# Patient Record
Sex: FEMALE | Race: WHITE | NOT HISPANIC OR LATINO | RURAL
[De-identification: names, ages, dates, MRNs, and addresses within clinical notes are randomized per-mention and may not be internally consistent; named-entity substitution may affect disease eponyms.]

---

## 2020-08-31 ENCOUNTER — LAB OUTSIDE AN ENCOUNTER (OUTPATIENT)
Dept: RURAL CLINIC 4 | Facility: CLINIC | Age: 55
End: 2020-08-31

## 2020-08-31 ENCOUNTER — OFFICE VISIT (OUTPATIENT)
Dept: RURAL CLINIC 4 | Facility: CLINIC | Age: 55
End: 2020-08-31
Payer: MEDICARE

## 2020-08-31 DIAGNOSIS — K21.9 HIATAL HERNIA WITH GASTROESOPHAGEAL REFLUX: ICD-10-CM

## 2020-08-31 DIAGNOSIS — Z12.11 COLON CANCER SCREENING: ICD-10-CM

## 2020-08-31 PROCEDURE — G8417 CALC BMI ABV UP PARAM F/U: HCPCS | Performed by: INTERNAL MEDICINE

## 2020-08-31 PROCEDURE — 3017F COLORECTAL CA SCREEN DOC REV: CPT | Performed by: INTERNAL MEDICINE

## 2020-08-31 PROCEDURE — 99244 OFF/OP CNSLTJ NEW/EST MOD 40: CPT | Performed by: INTERNAL MEDICINE

## 2020-08-31 PROCEDURE — 99204 OFFICE O/P NEW MOD 45 MIN: CPT | Performed by: INTERNAL MEDICINE

## 2020-08-31 PROCEDURE — G8427 DOCREV CUR MEDS BY ELIG CLIN: HCPCS | Performed by: INTERNAL MEDICINE

## 2020-08-31 PROCEDURE — G9903 PT SCRN TBCO ID AS NON USER: HCPCS | Performed by: INTERNAL MEDICINE

## 2020-08-31 PROCEDURE — 1036F TOBACCO NON-USER: CPT | Performed by: INTERNAL MEDICINE

## 2020-08-31 RX ORDER — POLYETHYLENE GLYCOL 3350, SODIUM SULFATE, SODIUM CHLORIDE, POTASSIUM CHLORIDE, ASCORBIC ACID, SODIUM ASCORBATE 140-9-5.2G
AS DIRECTED KIT ORAL ONCE
Qty: 1 KIT | Refills: 0 | OUTPATIENT
Start: 2020-08-31

## 2020-08-31 NOTE — HPI-TODAY'S VISIT:
AUG 2020 :  Patient was referred by  dr ayala     for further evaluation of    diarrhea and reflux . A note will be faxed over to referring provider with my recommendations.  Patient was referred for diarrhea.  Stool tests did not reveal any ova parasites.  Hemoglobin 11, normal WBC,, normal LFTs.  Giardia, cryptosporidia, C. difficile negative.  Stool culture negative. diarrhea has now resolved.   no prior colonoscopy. chronic gerd symptoms. progressively getting worse. was using dexilant for several yrs. insurance stopped paying. now using protonix and carafate. nut not helping. EGD 1 yr in [place]. was told she had hiatal hernia. was prescribed dexilant BID. which was working well.  No family history of colon cancer / GI malignancies / IBD. No  Dysphagia, Melena, Rectal bleeding, Weight loss, Diarrhea, Constipation, Abdominal pain.

## 2020-09-15 ENCOUNTER — TELEPHONE ENCOUNTER (OUTPATIENT)
Dept: URBAN - METROPOLITAN AREA CLINIC 92 | Facility: CLINIC | Age: 55
End: 2020-09-15

## 2020-09-21 ENCOUNTER — LAB OUTSIDE AN ENCOUNTER (OUTPATIENT)
Dept: URBAN - METROPOLITAN AREA CLINIC 92 | Facility: CLINIC | Age: 55
End: 2020-09-21

## 2020-09-21 ENCOUNTER — OFFICE VISIT (OUTPATIENT)
Dept: RURAL MEDICAL CENTER 2 | Facility: MEDICAL CENTER | Age: 55
End: 2020-09-21
Payer: MEDICARE

## 2020-09-21 ENCOUNTER — TELEPHONE ENCOUNTER (OUTPATIENT)
Dept: URBAN - METROPOLITAN AREA CLINIC 92 | Facility: CLINIC | Age: 55
End: 2020-09-21

## 2020-09-21 DIAGNOSIS — K31.7 BENIGN GASTRIC POLYP: ICD-10-CM

## 2020-09-21 DIAGNOSIS — K22.8 COLUMNAR-LINED ESOPHAGUS: ICD-10-CM

## 2020-09-21 DIAGNOSIS — R19.7 ACUTE DIARRHEA: ICD-10-CM

## 2020-09-21 DIAGNOSIS — K63.89 BACTERIAL OVERGROWTH SYNDROME: ICD-10-CM

## 2020-09-21 PROCEDURE — 45380 COLONOSCOPY AND BIOPSY: CPT | Performed by: INTERNAL MEDICINE

## 2020-09-21 PROCEDURE — 43239 EGD BIOPSY SINGLE/MULTIPLE: CPT | Performed by: INTERNAL MEDICINE

## 2020-09-21 PROCEDURE — G9937 DIG OR SURV COLSCO: HCPCS | Performed by: INTERNAL MEDICINE

## 2020-09-21 RX ORDER — POLYETHYLENE GLYOCOL 3350, SODIUM CHLORIDE, SODIUM BICARBONATE AND POTASSIUM CHLORIDE 420; 11.2; 5.72; 1.48 G/4L; G/4L; G/4L; G/4L
AS DIRECTED POWDER, FOR SOLUTION NASOGASTRIC; ORAL ONCE
Qty: 1 BOTTLE | Refills: 0 | OUTPATIENT
Start: 2020-09-21 | End: 2020-09-22

## 2020-09-21 RX ORDER — POLYETHYLENE GLYCOL 3350, SODIUM SULFATE, SODIUM CHLORIDE, POTASSIUM CHLORIDE, ASCORBIC ACID, SODIUM ASCORBATE 140-9-5.2G
AS DIRECTED KIT ORAL ONCE
Qty: 1 KIT | Refills: 0 | Status: ACTIVE | COMMUNITY
Start: 2020-08-31

## 2020-09-21 RX ORDER — BISACODYL 5 MG
2 TABLETS TABLET, DELAYED RELEASE (ENTERIC COATED) ORAL
Qty: 4 TABLET | Refills: 0 | OUTPATIENT
Start: 2020-09-21 | End: 2020-09-22

## 2020-10-06 ENCOUNTER — OFFICE VISIT (OUTPATIENT)
Dept: RURAL CLINIC 4 | Facility: CLINIC | Age: 55
End: 2020-10-06

## 2021-02-12 ENCOUNTER — OFFICE VISIT (OUTPATIENT)
Dept: RURAL CLINIC 2 | Facility: CLINIC | Age: 56
End: 2021-02-12
Payer: MEDICARE

## 2021-02-12 DIAGNOSIS — R19.4 CHANGE IN BOWEL HABITS: ICD-10-CM

## 2021-02-12 DIAGNOSIS — M06.9 RHEUMATOID ARTHRITIS: ICD-10-CM

## 2021-02-12 DIAGNOSIS — R19.7 DIARRHEA: ICD-10-CM

## 2021-02-12 DIAGNOSIS — E10.9 TYPE 1 DIABETES: ICD-10-CM

## 2021-02-12 DIAGNOSIS — K44.9 HIATAL HERNIA: ICD-10-CM

## 2021-02-12 DIAGNOSIS — K21.9 GERD: ICD-10-CM

## 2021-02-12 PROBLEM — 69896004 RHEUMATOID ARTHRITIS: Status: ACTIVE | Noted: 2021-02-12

## 2021-02-12 PROBLEM — 313435000 TYPE I DIABETES MELLITUS WITHOUT COMPLICATION: Status: ACTIVE | Noted: 2021-02-12

## 2021-02-12 PROCEDURE — G8482 FLU IMMUNIZE ORDER/ADMIN: HCPCS | Performed by: INTERNAL MEDICINE

## 2021-02-12 PROCEDURE — 3017F COLORECTAL CA SCREEN DOC REV: CPT | Performed by: INTERNAL MEDICINE

## 2021-02-12 PROCEDURE — G8427 DOCREV CUR MEDS BY ELIG CLIN: HCPCS | Performed by: INTERNAL MEDICINE

## 2021-02-12 PROCEDURE — G8417 CALC BMI ABV UP PARAM F/U: HCPCS | Performed by: INTERNAL MEDICINE

## 2021-02-12 PROCEDURE — 99214 OFFICE O/P EST MOD 30 MIN: CPT | Performed by: INTERNAL MEDICINE

## 2021-02-12 PROCEDURE — G9903 PT SCRN TBCO ID AS NON USER: HCPCS | Performed by: INTERNAL MEDICINE

## 2021-02-12 RX ORDER — POLYETHYLENE GLYCOL 3350, SODIUM SULFATE, SODIUM CHLORIDE, POTASSIUM CHLORIDE, ASCORBIC ACID, SODIUM ASCORBATE 140-9-5.2G
AS DIRECTED KIT ORAL ONCE
Qty: 1 KIT | Refills: 0 | Status: DISCONTINUED | COMMUNITY
Start: 2020-08-31

## 2021-02-12 RX ORDER — HYDROCHLOROTHIAZIDE 25 MG/1
1 TABLET IN THE MORNING TABLET ORAL ONCE A DAY
Status: ACTIVE | COMMUNITY

## 2021-02-12 RX ORDER — PANTOPRAZOLE SODIUM 20 MG/1
1 TABLET TABLET, DELAYED RELEASE ORAL ONCE A DAY
Status: ACTIVE | COMMUNITY

## 2021-02-12 RX ORDER — TRAMADOL HYDROCHLORIDE 50 MG/1
1 TABLET AS NEEDED TABLET, FILM COATED ORAL ONCE A DAY
Status: DISCONTINUED | COMMUNITY

## 2021-02-12 RX ORDER — POTASSIUM CHLORIDE 10 MEQ/1
1 TABLET WITH FOOD TABLET, FILM COATED, EXTENDED RELEASE ORAL TWICE A DAY
Status: ACTIVE | COMMUNITY

## 2021-02-12 RX ORDER — LORATADINE AND PSEUDOEPHEDRINE SULFATE 5; 120 MG/1; MG/1
1 TABLET AS NEEDED TABLET, EXTENDED RELEASE ORAL
Status: DISCONTINUED | COMMUNITY

## 2021-02-12 RX ORDER — NAPROXEN 500 MG/1
1 TABLET WITH FOOD OR MILK AS NEEDED TABLET ORAL
Status: DISCONTINUED | COMMUNITY

## 2021-02-12 RX ORDER — DEXLANSOPRAZOLE 60 MG/1
1 CAPSULE CAPSULE, DELAYED RELEASE ORAL ONCE A DAY
Qty: 90 | Refills: 3 | OUTPATIENT
Start: 2021-02-12

## 2021-02-12 RX ORDER — INSULIN ASPART 100 [IU]/ML
AS DIRECTED INJECTION, SOLUTION INTRAVENOUS; SUBCUTANEOUS
Status: DISCONTINUED | COMMUNITY

## 2021-02-12 NOTE — HPI-TODAY'S VISIT:
PT comes for evaluation of diarrhea. Saw Dr. Edward about this in 9/2020 but did not like him and wants to change to me. she was having issues with diarrhea. would go several weeks with diarrhea up to 6 times a day - she tells me that she is a type 1 diabetic. she has vasovagal issues. has hiatal hernia and is on PPI. she still has to take mylanta. she was on dexilant for years but insurance would not cover it. - she has not had a ccx.

## 2021-02-15 ENCOUNTER — TELEPHONE ENCOUNTER (OUTPATIENT)
Dept: RURAL CLINIC 2 | Facility: CLINIC | Age: 56
End: 2021-02-15

## 2021-02-15 RX ORDER — DEXLANSOPRAZOLE 60 MG/1
1 CAPSULE CAPSULE, DELAYED RELEASE ORAL ONCE A DAY
Qty: 90 | Refills: 3 | OUTPATIENT
Start: 2021-02-15

## 2021-02-22 ENCOUNTER — TELEPHONE ENCOUNTER (OUTPATIENT)
Dept: URBAN - METROPOLITAN AREA CLINIC 105 | Facility: CLINIC | Age: 56
End: 2021-02-22

## 2021-03-01 LAB
C. DIFFICILE TOXIN A/B, STOOL - QDX: (no result)
GASTROINTESTINAL PATHOGEN: (no result)
PANCREATICELASTASE ELISA, STOOL: (no result)

## 2021-03-04 ENCOUNTER — TELEPHONE ENCOUNTER (OUTPATIENT)
Dept: RURAL CLINIC 4 | Facility: CLINIC | Age: 56
End: 2021-03-04

## 2021-03-16 ENCOUNTER — TELEPHONE ENCOUNTER (OUTPATIENT)
Dept: RURAL CLINIC 2 | Facility: CLINIC | Age: 56
End: 2021-03-16

## 2021-03-16 RX ORDER — PANCRELIPASE LIPASE, PANCRELIPASE PROTEASE, PANCRELIPASE AMYLASE 40000; 126000; 168000 [USP'U]/1; [USP'U]/1; [USP'U]/1
1 CAPSULE CAPSULE, DELAYED RELEASE ORAL
Qty: 270 | Refills: 3 | OUTPATIENT
Start: 2021-03-16 | End: 2022-03-10

## 2021-03-19 ENCOUNTER — TELEPHONE ENCOUNTER (OUTPATIENT)
Dept: URBAN - METROPOLITAN AREA CLINIC 105 | Facility: CLINIC | Age: 56
End: 2021-03-19

## 2021-03-19 RX ORDER — PANCRELIPASE 36000; 180000; 114000 [USP'U]/1; [USP'U]/1; [USP'U]/1
TWO CAPSULES CAPSULE, DELAYED RELEASE PELLETS ORAL
Qty: 540 | Refills: 3 | OUTPATIENT
Start: 2021-03-19 | End: 2021-07-17

## 2021-04-09 ENCOUNTER — OFFICE VISIT (OUTPATIENT)
Dept: RURAL CLINIC 2 | Facility: CLINIC | Age: 56
End: 2021-04-09
Payer: MEDICARE

## 2021-04-09 DIAGNOSIS — E10.9 TYPE 1 DIABETES MELLITUS WITHOUT COMPLICATION: ICD-10-CM

## 2021-04-09 DIAGNOSIS — R94.5 ABNORMAL LFTS: ICD-10-CM

## 2021-04-09 DIAGNOSIS — K86.81 EXOCRINE PANCREATIC INSUFFICIENCY: ICD-10-CM

## 2021-04-09 DIAGNOSIS — K21.9 GASTROESOPHAGEAL REFLUX DISEASE WITHOUT ESOPHAGITIS: ICD-10-CM

## 2021-04-09 DIAGNOSIS — R19.7 DIARRHEA, UNSPECIFIED TYPE: ICD-10-CM

## 2021-04-09 PROCEDURE — 99214 OFFICE O/P EST MOD 30 MIN: CPT | Performed by: INTERNAL MEDICINE

## 2021-04-09 RX ORDER — DEXLANSOPRAZOLE 60 MG/1
1 CAPSULE CAPSULE, DELAYED RELEASE ORAL ONCE A DAY
Qty: 90 | Refills: 3 | Status: DISCONTINUED | COMMUNITY
Start: 2021-02-12

## 2021-04-09 RX ORDER — PANCRELIPASE 36000; 180000; 114000 [USP'U]/1; [USP'U]/1; [USP'U]/1
TWO CAPSULES CAPSULE, DELAYED RELEASE PELLETS ORAL
Qty: 540 | Refills: 3 | Status: ACTIVE | COMMUNITY
Start: 2021-03-19 | End: 2021-07-17

## 2021-04-09 RX ORDER — PANTOPRAZOLE SODIUM 20 MG/1
1 TABLET TABLET, DELAYED RELEASE ORAL ONCE A DAY
Status: ACTIVE | COMMUNITY

## 2021-04-09 RX ORDER — DEXLANSOPRAZOLE 60 MG/1
1 CAPSULE CAPSULE, DELAYED RELEASE ORAL ONCE A DAY
Qty: 90 | Refills: 3 | Status: DISCONTINUED | COMMUNITY
Start: 2021-02-15

## 2021-04-09 RX ORDER — HYDROCHLOROTHIAZIDE 25 MG/1
1 TABLET IN THE MORNING TABLET ORAL ONCE A DAY
Status: ACTIVE | COMMUNITY

## 2021-04-09 RX ORDER — PANCRELIPASE LIPASE, PANCRELIPASE PROTEASE, PANCRELIPASE AMYLASE 40000; 126000; 168000 [USP'U]/1; [USP'U]/1; [USP'U]/1
1 CAPSULE CAPSULE, DELAYED RELEASE ORAL
Qty: 270 | Refills: 3 | Status: DISCONTINUED | COMMUNITY
Start: 2021-03-16 | End: 2022-03-10

## 2021-04-09 RX ORDER — POTASSIUM CHLORIDE 10 MEQ/1
1 TABLET WITH FOOD TABLET, FILM COATED, EXTENDED RELEASE ORAL TWICE A DAY
Status: ACTIVE | COMMUNITY

## 2021-04-09 NOTE — HPI-TODAY'S VISIT:
I saw this patient in February 2021 for diarrhea.  Stool study showed severe exocrine pancreatic insufficiency.  We started Zenpep for that which helped. -   She comes now for evaluation of abnormal liver enzymes.  Laboratory from April 2021 shows an alkaline phosphatase markedly elevated at 559.  AST and ALT were 55 and 62 respectively.  Total bilirubin normal at less than 0.2.  Albumin normal at 3.7.

## 2021-04-16 ENCOUNTER — TELEPHONE ENCOUNTER (OUTPATIENT)
Dept: URBAN - METROPOLITAN AREA CLINIC 92 | Facility: CLINIC | Age: 56
End: 2021-04-16

## 2021-04-29 ENCOUNTER — TELEPHONE ENCOUNTER (OUTPATIENT)
Dept: URBAN - METROPOLITAN AREA CLINIC 105 | Facility: CLINIC | Age: 56
End: 2021-04-29

## 2021-04-29 RX ORDER — PANCRELIPASE 20880; 78300; 78300 [USP'U]/1; [USP'U]/1; [USP'U]/1
THREE CAPSULES TABLET ORAL
Qty: 540 | Refills: 3 | OUTPATIENT
Start: 2021-04-29 | End: 2022-04-24

## 2021-05-10 ENCOUNTER — TELEPHONE ENCOUNTER (OUTPATIENT)
Dept: URBAN - METROPOLITAN AREA CLINIC 92 | Facility: CLINIC | Age: 56
End: 2021-05-10

## 2021-05-10 RX ORDER — PANCRELIPASE LIPASE, PANCRELIPASE PROTEASE, PANCRELIPASE AMYLASE 40000; 126000; 168000 [USP'U]/1; [USP'U]/1; [USP'U]/1
1 CAPSULE CAPSULE, DELAYED RELEASE ORAL
Qty: 270 | Refills: 3 | OUTPATIENT
Start: 2021-03-16 | End: 2022-05-05

## 2021-05-18 ENCOUNTER — TELEPHONE ENCOUNTER (OUTPATIENT)
Dept: URBAN - METROPOLITAN AREA CLINIC 92 | Facility: CLINIC | Age: 56
End: 2021-05-18

## 2021-11-09 ENCOUNTER — WEB ENCOUNTER (OUTPATIENT)
Dept: RURAL CLINIC 2 | Facility: CLINIC | Age: 56
End: 2021-11-09

## 2021-11-09 ENCOUNTER — OFFICE VISIT (OUTPATIENT)
Dept: RURAL CLINIC 4 | Facility: CLINIC | Age: 56
End: 2021-11-09
Payer: MEDICARE

## 2021-11-09 DIAGNOSIS — D50.0 IRON DEFICIENCY ANEMIA DUE TO CHRONIC BLOOD LOSS: ICD-10-CM

## 2021-11-09 DIAGNOSIS — K21.9 GERD WITHOUT ESOPHAGITIS: ICD-10-CM

## 2021-11-09 DIAGNOSIS — K86.81 EXOCRINE PANCREATIC INSUFFICIENCY: ICD-10-CM

## 2021-11-09 DIAGNOSIS — R94.5 ABNORMAL LFTS: ICD-10-CM

## 2021-11-09 DIAGNOSIS — E10.9 TYPE 1 DIABETES MELLITUS WITHOUT COMPLICATION: ICD-10-CM

## 2021-11-09 PROBLEM — 724556004: Status: ACTIVE | Noted: 2021-11-09

## 2021-11-09 PROBLEM — 313435000: Status: ACTIVE | Noted: 2021-04-09

## 2021-11-09 PROCEDURE — 99214 OFFICE O/P EST MOD 30 MIN: CPT | Performed by: INTERNAL MEDICINE

## 2021-11-09 RX ORDER — PANCRELIPASE LIPASE, PANCRELIPASE PROTEASE, PANCRELIPASE AMYLASE 40000; 126000; 168000 [USP'U]/1; [USP'U]/1; [USP'U]/1
1 CAPSULE CAPSULE, DELAYED RELEASE ORAL
Qty: 270 | Refills: 3 | Status: ACTIVE | COMMUNITY
Start: 2021-03-16 | End: 2022-05-05

## 2021-11-09 RX ORDER — PANCRELIPASE 20880; 78300; 78300 [USP'U]/1; [USP'U]/1; [USP'U]/1
THREE CAPSULES TABLET ORAL
Qty: 540 | Refills: 3 | Status: DISCONTINUED | COMMUNITY
Start: 2021-04-29 | End: 2022-04-24

## 2021-11-09 RX ORDER — PANCRELIPASE LIPASE, PANCRELIPASE PROTEASE, PANCRELIPASE AMYLASE 40000; 126000; 168000 [USP'U]/1; [USP'U]/1; [USP'U]/1
2 CAPSULE CAPSULE, DELAYED RELEASE ORAL
Qty: 540 | Refills: 3
Start: 2021-03-16 | End: 2022-11-04

## 2021-11-09 RX ORDER — SODIUM SULFATE, MAGNESIUM SULFATE, AND POTASSIUM CHLORIDE 17.75; 2.7; 2.25 G/1; G/1; G/1
12 TABLETS TABLET ORAL
Qty: 24 TABLETS | Refills: 0 | OUTPATIENT
Start: 2021-11-09 | End: 2021-11-10

## 2021-11-09 RX ORDER — HYDROCHLOROTHIAZIDE 25 MG/1
1 TABLET IN THE MORNING TABLET ORAL ONCE A DAY
Status: ACTIVE | COMMUNITY

## 2021-11-09 RX ORDER — PANTOPRAZOLE SODIUM 20 MG/1
1 TABLET TABLET, DELAYED RELEASE ORAL TWICE A DAY
Status: ACTIVE | COMMUNITY

## 2021-11-09 RX ORDER — POTASSIUM CHLORIDE 10 MEQ/1
1 TABLET WITH FOOD TABLET, FILM COATED, EXTENDED RELEASE ORAL TWICE A DAY
Status: ACTIVE | COMMUNITY

## 2021-11-09 NOTE — HPI-TODAY'S VISIT:
patient comes for follow-up.  She has severe exocrine pancreatic insufficiency.  Her insurance made us try at least 4 5 different pancreatic enzyme supplements before they would finally approved 1.  I sent numerous prescriptions for just about every pancreatic enzyme supplement on the market before they would approve it.  We also did or a tempted to do a serological workup for abnormally elevated alkaline phosphatase and AST ALT.  After 6 months of waiting, we still never got the anti mitochondrial antibody.  It still not been resulted to the chart.  The lab dorian the wrong lab on 2 separate occasions.\ - pt reports having a new diagnosis of TERRY. she has been started on an iron supplement. her colon was in complete due to poor prep.

## 2022-08-13 ENCOUNTER — OFFICE VISIT (OUTPATIENT)
Dept: RURAL CLINIC 2 | Facility: CLINIC | Age: 57
End: 2022-08-13
Payer: MEDICARE

## 2022-08-13 VITALS
WEIGHT: 260 LBS | HEIGHT: 68 IN | TEMPERATURE: 97.2 F | SYSTOLIC BLOOD PRESSURE: 134 MMHG | BODY MASS INDEX: 39.4 KG/M2 | DIASTOLIC BLOOD PRESSURE: 79 MMHG | HEART RATE: 80 BPM

## 2022-08-13 DIAGNOSIS — E66.3 OVERWEIGHT: ICD-10-CM

## 2022-08-13 DIAGNOSIS — K86.81 EXOCRINE PANCREATIC INSUFFICIENCY: ICD-10-CM

## 2022-08-13 DIAGNOSIS — E11.9 TYPE 2 DIABETES MELLITUS WITHOUT COMPLICATIONS: ICD-10-CM

## 2022-08-13 DIAGNOSIS — K21.9 GERD WITHOUT ESOPHAGITIS: ICD-10-CM

## 2022-08-13 DIAGNOSIS — R79.89 ABNORMAL LFTS: ICD-10-CM

## 2022-08-13 DIAGNOSIS — M06.9 RHEUMATOID ARTHRITIS, INVOLVING UNSPECIFIED SITE, UNSPECIFIED WHETHER RHEUMATOID FACTOR PRESENT: ICD-10-CM

## 2022-08-13 DIAGNOSIS — Z79.4 LONG TERM (CURRENT) USE OF INSULIN: ICD-10-CM

## 2022-08-13 PROBLEM — 710815001: Status: ACTIVE | Noted: 2022-08-13

## 2022-08-13 PROBLEM — 266435005: Status: ACTIVE | Noted: 2021-11-09

## 2022-08-13 PROBLEM — 313436004: Status: ACTIVE | Noted: 2022-08-13

## 2022-08-13 PROBLEM — 69896004: Status: ACTIVE | Noted: 2022-08-13

## 2022-08-13 PROCEDURE — 99214 OFFICE O/P EST MOD 30 MIN: CPT | Performed by: INTERNAL MEDICINE

## 2022-08-13 RX ORDER — HYDROCHLOROTHIAZIDE 25 MG/1
1 TABLET IN THE MORNING TABLET ORAL ONCE A DAY
Status: ACTIVE | COMMUNITY

## 2022-08-13 RX ORDER — POTASSIUM CHLORIDE 10 MEQ/1
1 TABLET WITH FOOD TABLET, FILM COATED, EXTENDED RELEASE ORAL TWICE A DAY
Status: ACTIVE | COMMUNITY

## 2022-08-13 RX ORDER — PANTOPRAZOLE SODIUM 20 MG/1
1 TABLET TABLET, DELAYED RELEASE ORAL TWICE A DAY
Status: ACTIVE | COMMUNITY

## 2022-08-13 RX ORDER — PANCRELIPASE LIPASE, PANCRELIPASE PROTEASE, PANCRELIPASE AMYLASE 40000; 126000; 168000 [USP'U]/1; [USP'U]/1; [USP'U]/1
2 CAPSULE CAPSULE, DELAYED RELEASE ORAL
Qty: 540 | Refills: 3 | Status: ACTIVE | COMMUNITY
Start: 2021-03-16 | End: 2022-11-04

## 2022-08-13 NOTE — HPI-TODAY'S VISIT:
patient comes for follow-up.  She has severe exocrine pancreatic insufficiency.  Her insurance made us try at least 4 5 different pancreatic enzyme supplements before they would finally approved 1.  I sent numerous prescriptions for just about every pancreatic enzyme supplement on the market before they would approve it.  We also did or a tempted to do a serological workup for abnormally elevated alkaline phosphatase and AST ALT.  After 6 months of waiting, we still never got the anti mitochondrial antibody.  It still not been resulted to the chart.  The lab dorian the wrong lab on 2 separate occasions.\ - pt reports having a new diagnosis of TERRY. she has been started on an iron supplement. her colon was in complete due to poor prep. -   08/13/2022:  Patient last seen in November of 2021 to follow-up exocrine pancreatic insufficiency and abnormal liver enzymes and a new diagnosis of iron deficiency anemia.  I suggested that we do colonoscopy to exclude any lesion in the GI tract to explain her iron deficiency.  At this nearly 1 year interval, I do not see where that was ever accomplished. -   Most recent laboratory from February 2022 shows a hemoglobin of 10 with a normal MCV, normal ferritin, serum iron was slightly high.  Anti mitochondrial antibody was normal.  Ultrasound of the right upper quadrant from April of 2021 was normal. -   His recent laboratory from July of 2022 from her rheumatologist office show AST and ALT of 101 32 respectively with an alkaline phosphatase of 342 bilirubin is normal. she reports that she has gained 20 lb in the last 3 months

## 2022-08-24 ENCOUNTER — TELEPHONE ENCOUNTER (OUTPATIENT)
Dept: URBAN - METROPOLITAN AREA CLINIC 105 | Facility: CLINIC | Age: 57
End: 2022-08-24

## 2022-08-25 ENCOUNTER — WEB ENCOUNTER (OUTPATIENT)
Dept: RURAL CLINIC 2 | Facility: CLINIC | Age: 57
End: 2022-08-25

## 2022-10-07 ENCOUNTER — ERX REFILL RESPONSE (OUTPATIENT)
Dept: RURAL CLINIC 2 | Facility: CLINIC | Age: 57
End: 2022-10-07

## 2022-10-07 RX ORDER — PANCRELIPASE LIPASE, PANCRELIPASE PROTEASE, PANCRELIPASE AMYLASE 40000; 126000; 168000 [USP'U]/1; [USP'U]/1; [USP'U]/1
TAKE 2 CAPSULES THREE TIMES A DAY WITH MEALS CAPSULE, DELAYED RELEASE ORAL
Qty: 500 CAPSULE | Refills: 4 | OUTPATIENT

## 2022-10-07 RX ORDER — PANCRELIPASE LIPASE, PANCRELIPASE PROTEASE, PANCRELIPASE AMYLASE 40000; 126000; 168000 [USP'U]/1; [USP'U]/1; [USP'U]/1
TAKE 2 CAPSULES THREE TIMES A DAY WITH MEALS CAPSULE, DELAYED RELEASE ORAL
Qty: 500 CAPSULE | Refills: 3 | OUTPATIENT

## 2023-07-25 ENCOUNTER — OFFICE VISIT (OUTPATIENT)
Dept: RURAL CLINIC 2 | Facility: CLINIC | Age: 58
End: 2023-07-25
Payer: MEDICARE

## 2023-07-25 ENCOUNTER — LAB OUTSIDE AN ENCOUNTER (OUTPATIENT)
Dept: RURAL CLINIC 2 | Facility: CLINIC | Age: 58
End: 2023-07-25

## 2023-07-25 ENCOUNTER — DASHBOARD ENCOUNTERS (OUTPATIENT)
Age: 58
End: 2023-07-25

## 2023-07-25 VITALS
SYSTOLIC BLOOD PRESSURE: 150 MMHG | HEART RATE: 83 BPM | TEMPERATURE: 97.3 F | HEIGHT: 68 IN | WEIGHT: 276 LBS | DIASTOLIC BLOOD PRESSURE: 78 MMHG | BODY MASS INDEX: 41.83 KG/M2

## 2023-07-25 DIAGNOSIS — K76.0 FATTY LIVER DISEASE, NONALCOHOLIC: ICD-10-CM

## 2023-07-25 DIAGNOSIS — K21.9 ACID REFLUX: ICD-10-CM

## 2023-07-25 DIAGNOSIS — K86.81 EXOCRINE PANCREATIC INSUFFICIENCY: ICD-10-CM

## 2023-07-25 DIAGNOSIS — R19.7 ACUTE DIARRHEA: ICD-10-CM

## 2023-07-25 PROBLEM — 235595009: Status: ACTIVE | Noted: 2023-07-25

## 2023-07-25 PROBLEM — 1231824009: Status: ACTIVE | Noted: 2023-07-25

## 2023-07-25 PROBLEM — 84089009: Status: ACTIVE | Noted: 2023-07-25

## 2023-07-25 PROBLEM — 47367009: Status: ACTIVE | Noted: 2023-07-25

## 2023-07-25 PROBLEM — 236071009: Status: ACTIVE | Noted: 2023-07-25

## 2023-07-25 PROCEDURE — 99214 OFFICE O/P EST MOD 30 MIN: CPT | Performed by: NURSE PRACTITIONER

## 2023-07-25 RX ORDER — PANCRELIPASE LIPASE, PANCRELIPASE PROTEASE, PANCRELIPASE AMYLASE 40000; 126000; 168000 [USP'U]/1; [USP'U]/1; [USP'U]/1
AS DIRECTED CAPSULE, DELAYED RELEASE ORAL
Qty: 750 | Refills: 3 | OUTPATIENT
Start: 2023-07-25 | End: 2024-07-19

## 2023-07-25 RX ORDER — PANCRELIPASE LIPASE, PANCRELIPASE PROTEASE, PANCRELIPASE AMYLASE 40000; 126000; 168000 [USP'U]/1; [USP'U]/1; [USP'U]/1
TAKE 2 CAPSULES THREE TIMES A DAY WITH MEALS CAPSULE, DELAYED RELEASE ORAL
Qty: 500 CAPSULE | Refills: 3 | Status: ACTIVE | COMMUNITY

## 2023-07-25 RX ORDER — PANTOPRAZOLE SODIUM 20 MG/1
1 TABLET TABLET, DELAYED RELEASE ORAL TWICE A DAY
Status: ACTIVE | COMMUNITY

## 2023-07-25 RX ORDER — PANTOPRAZOLE SODIUM 40 MG/1
1 TABLET TABLET, DELAYED RELEASE ORAL ONCE A DAY
Qty: 90 TABLET | Refills: 3 | OUTPATIENT
Start: 2023-07-25

## 2023-07-25 RX ORDER — HYDROCHLOROTHIAZIDE 25 MG/1
1 TABLET IN THE MORNING TABLET ORAL ONCE A DAY
Status: ACTIVE | COMMUNITY

## 2023-07-25 RX ORDER — FAMOTIDINE 40 MG/1
1 TABLET AT BEDTIME TABLET, FILM COATED ORAL ONCE A DAY
Qty: 90 TABLET | Refills: 3 | OUTPATIENT
Start: 2023-07-25

## 2023-07-25 RX ORDER — POTASSIUM CHLORIDE 10 MEQ/1
1 TABLET WITH FOOD TABLET, FILM COATED, EXTENDED RELEASE ORAL TWICE A DAY
Status: ACTIVE | COMMUNITY

## 2023-07-25 NOTE — HPI-ZZZTODAY'S VISIT
The patient returns for follow-up for exocrine pancreatic insufficiency.  She continues on Zenpep and reports having cyclic episodes of diarrhea every 5 to 6 days lasting 24 to 48 hours and then resolving.  She reportedly was drinking a couple of glasses of 2 percent cows milk daily for years and switched to lactose-free milk 2 weeks ago and has had no further diarrhea.  Her last colonoscopy was completed in 2020 and aborted due to poor prep.  She was scheduled for a colonoscopy last year as her iron levels were low and she did not complete testing.  Her hemoglobin and iron levels on her last routine blood work were noted to be normal per her own words.  History of  chronic GERD and continues on  pantoprazole and reports drinking Mylanta multiple times during the week due to breakthrough acid reflux and sometimes  will wake  her at night.  Her last upper endoscopy was completed in 2020, records available and reviewed  revealing a 5 cm hiatal hernia and grade B GERD.   History of persistently elevated alkaline phosphatase level and chronic liver disease work-up was unrevealing.  Right upper quadrant ultrasound completed 1 year ago was noted to be unremarkable.  Past medical history of rheumatoid arthritis and continues on Xeljanz.

## 2023-11-07 ENCOUNTER — OFFICE VISIT (OUTPATIENT)
Dept: RURAL MEDICAL CENTER 4 | Facility: MEDICAL CENTER | Age: 58
End: 2023-11-07

## 2024-06-10 ENCOUNTER — TELEPHONE ENCOUNTER (OUTPATIENT)
Dept: RURAL CLINIC 8 | Facility: CLINIC | Age: 59
End: 2024-06-10

## 2024-06-10 RX ORDER — PANCRELIPASE LIPASE, PANCRELIPASE PROTEASE, PANCRELIPASE AMYLASE 40000; 126000; 168000 [USP'U]/1; [USP'U]/1; [USP'U]/1
AS DIRECTED CAPSULE, DELAYED RELEASE ORAL
Qty: 750 | Refills: 1 | OUTPATIENT
Start: 2024-06-10 | End: 2024-12-06

## 2024-07-09 ENCOUNTER — TELEPHONE ENCOUNTER (OUTPATIENT)
Dept: RURAL CLINIC 8 | Facility: CLINIC | Age: 59
End: 2024-07-09

## 2024-07-09 RX ORDER — FAMOTIDINE 40 MG/1
1 TABLET TABLET, FILM COATED ORAL ONCE A DAY
Qty: 90 TABLET | Refills: 3 | OUTPATIENT
Start: 2024-07-09

## 2024-07-17 ENCOUNTER — LAB OUTSIDE AN ENCOUNTER (OUTPATIENT)
Dept: RURAL CLINIC 2 | Facility: CLINIC | Age: 59
End: 2024-07-17

## 2024-07-17 ENCOUNTER — OFFICE VISIT (OUTPATIENT)
Dept: RURAL CLINIC 2 | Facility: CLINIC | Age: 59
End: 2024-07-17
Payer: MEDICARE

## 2024-07-17 VITALS
TEMPERATURE: 96.9 F | HEART RATE: 86 BPM | DIASTOLIC BLOOD PRESSURE: 83 MMHG | BODY MASS INDEX: 41.68 KG/M2 | HEIGHT: 68 IN | SYSTOLIC BLOOD PRESSURE: 145 MMHG | WEIGHT: 275 LBS

## 2024-07-17 DIAGNOSIS — K76.0 FATTY LIVER DISEASE, NONALCOHOLIC: ICD-10-CM

## 2024-07-17 DIAGNOSIS — K86.81 EXOCRINE PANCREATIC INSUFFICIENCY: ICD-10-CM

## 2024-07-17 DIAGNOSIS — K21.9 GASTROESOPHAGEAL REFLUX DISEASE, UNSPECIFIED WHETHER ESOPHAGITIS PRESENT: ICD-10-CM

## 2024-07-17 PROCEDURE — 99214 OFFICE O/P EST MOD 30 MIN: CPT | Performed by: NURSE PRACTITIONER

## 2024-07-17 RX ORDER — FAMOTIDINE 40 MG/1
1 TABLET AT BEDTIME TABLET, FILM COATED ORAL ONCE A DAY
Qty: 90 TABLET | Refills: 3 | OUTPATIENT

## 2024-07-17 RX ORDER — FAMOTIDINE 40 MG/1
1 TABLET AT BEDTIME TABLET, FILM COATED ORAL ONCE A DAY
Qty: 90 TABLET | Refills: 3 | Status: ACTIVE | COMMUNITY
Start: 2023-07-25

## 2024-07-17 RX ORDER — PANCRELIPASE LIPASE, PANCRELIPASE PROTEASE, PANCRELIPASE AMYLASE 40000; 126000; 168000 [USP'U]/1; [USP'U]/1; [USP'U]/1
TAKE 2 CAPSULES THREE TIMES A DAY WITH MEALS CAPSULE, DELAYED RELEASE ORAL
Qty: 500 CAPSULE | Refills: 3 | Status: DISCONTINUED | COMMUNITY

## 2024-07-17 RX ORDER — PANTOPRAZOLE SODIUM 40 MG/1
1 TABLET TABLET, DELAYED RELEASE ORAL ONCE A DAY
Qty: 90 TABLET | Refills: 3 | OUTPATIENT

## 2024-07-17 RX ORDER — HYDROCHLOROTHIAZIDE 25 MG/1
1 TABLET IN THE MORNING TABLET ORAL ONCE A DAY
Status: ACTIVE | COMMUNITY

## 2024-07-17 RX ORDER — PANCRELIPASE LIPASE, PANCRELIPASE PROTEASE, PANCRELIPASE AMYLASE 40000; 126000; 168000 [USP'U]/1; [USP'U]/1; [USP'U]/1
AS DIRECTED CAPSULE, DELAYED RELEASE ORAL
Qty: 750 | Refills: 3 | OUTPATIENT

## 2024-07-17 RX ORDER — PANTOPRAZOLE SODIUM 20 MG/1
1 TABLET TABLET, DELAYED RELEASE ORAL TWICE A DAY
Status: DISCONTINUED | COMMUNITY

## 2024-07-17 RX ORDER — POTASSIUM CHLORIDE 10 MEQ/1
1 TABLET WITH FOOD TABLET, FILM COATED, EXTENDED RELEASE ORAL TWICE A DAY
Status: ACTIVE | COMMUNITY

## 2024-07-17 RX ORDER — PANCRELIPASE LIPASE, PANCRELIPASE PROTEASE, PANCRELIPASE AMYLASE 40000; 126000; 168000 [USP'U]/1; [USP'U]/1; [USP'U]/1
AS DIRECTED CAPSULE, DELAYED RELEASE ORAL
Qty: 750 | Refills: 3 | Status: DISCONTINUED | COMMUNITY
Start: 2023-07-25 | End: 2024-07-19

## 2024-07-17 RX ORDER — PANTOPRAZOLE SODIUM 40 MG/1
1 TABLET TABLET, DELAYED RELEASE ORAL ONCE A DAY
Qty: 90 TABLET | Refills: 3 | Status: ACTIVE | COMMUNITY
Start: 2023-07-25

## 2024-07-17 RX ORDER — PANCRELIPASE LIPASE, PANCRELIPASE PROTEASE, PANCRELIPASE AMYLASE 40000; 126000; 168000 [USP'U]/1; [USP'U]/1; [USP'U]/1
AS DIRECTED CAPSULE, DELAYED RELEASE ORAL
Qty: 750 | Refills: 1 | Status: ACTIVE | COMMUNITY
Start: 2024-06-10 | End: 2024-12-06

## 2024-07-17 NOTE — HPI-ZZZTODAY'S VISIT
The patient returns for follow-up for exocrine pancreatic insufficiency.  She continues on Zenpep and reports having cyclic episodes of diarrhea every 5 to 6 days lasting 24 to 48 hours and then resolving.  She reportedly was drinking a couple of glasses of 2 percent cows milk daily for years and switched to lactose-free milk 2 weeks ago and has had no further diarrhea.  Her last colonoscopy was completed in 2020 and aborted due to poor prep.  She was scheduled for a colonoscopy last year as her iron levels were low and she did not complete testing.  Her hemoglobin and iron levels on her last routine blood work were noted to be normal per her own words.  History of  chronic GERD and continues on  pantoprazole and reports drinking Mylanta multiple times during the week due to breakthrough acid reflux and sometimes  will wake  her at night.  Her last upper endoscopy was completed in 2020, records available and reviewed  revealing a 5 cm hiatal hernia and grade B GERD.   History of persistently elevated alkaline phosphatase level and chronic liver disease work-up was unrevealing.  Right upper quadrant ultrasound completed 1 year ago was noted to be unremarkable.  Past medical history of rheumatoid arthritis and continues on Xeljanz. 07/17/2024 The patient is here for her yearly follow-up for history of EPI and fatty liver disease.  She continues on Zenpep and has had no further diarrhea over the past several months.  She occasionally will have constipation.  She continues on pantoprazole and famotidine for chronic GERD with intermittent breakthrough typically at night.  When I saw her last year I scheduled her for bidirectional endoscopy and she canceled the procedures due to a personal conflict.

## 2025-01-16 ENCOUNTER — OFFICE VISIT (OUTPATIENT)
Dept: RURAL CLINIC 4 | Facility: CLINIC | Age: 60
End: 2025-01-16

## 2025-01-16 RX ORDER — FAMOTIDINE 40 MG/1
1 TABLET AT BEDTIME TABLET, FILM COATED ORAL ONCE A DAY
Qty: 90 TABLET | Refills: 3 | Status: ACTIVE | COMMUNITY

## 2025-01-16 RX ORDER — PANCRELIPASE LIPASE, PANCRELIPASE PROTEASE, PANCRELIPASE AMYLASE 40000; 126000; 168000 [USP'U]/1; [USP'U]/1; [USP'U]/1
AS DIRECTED CAPSULE, DELAYED RELEASE ORAL
Qty: 750 | Refills: 3 | Status: ACTIVE | COMMUNITY

## 2025-01-16 RX ORDER — PANTOPRAZOLE SODIUM 40 MG/1
1 TABLET TABLET, DELAYED RELEASE ORAL ONCE A DAY
Qty: 90 TABLET | Refills: 3 | Status: ACTIVE | COMMUNITY

## 2025-01-16 RX ORDER — POTASSIUM CHLORIDE 10 MEQ/1
1 TABLET WITH FOOD TABLET, FILM COATED, EXTENDED RELEASE ORAL TWICE A DAY
Status: ACTIVE | COMMUNITY

## 2025-01-16 RX ORDER — HYDROCHLOROTHIAZIDE 25 MG/1
1 TABLET IN THE MORNING TABLET ORAL ONCE A DAY
Status: ACTIVE | COMMUNITY

## 2025-01-17 ENCOUNTER — OFFICE VISIT (OUTPATIENT)
Dept: RURAL CLINIC 2 | Facility: CLINIC | Age: 60
End: 2025-01-17

## 2025-01-24 ENCOUNTER — TELEPHONE ENCOUNTER (OUTPATIENT)
Dept: RURAL CLINIC 2 | Facility: CLINIC | Age: 60
End: 2025-01-24

## 2025-01-24 ENCOUNTER — LAB OUTSIDE AN ENCOUNTER (OUTPATIENT)
Dept: RURAL CLINIC 8 | Facility: CLINIC | Age: 60
End: 2025-01-24

## 2025-01-31 ENCOUNTER — LAB OUTSIDE AN ENCOUNTER (OUTPATIENT)
Dept: RURAL CLINIC 2 | Facility: CLINIC | Age: 60
End: 2025-01-31

## 2025-01-31 ENCOUNTER — OFFICE VISIT (OUTPATIENT)
Dept: RURAL CLINIC 2 | Facility: CLINIC | Age: 60
End: 2025-01-31

## 2025-01-31 VITALS
HEIGHT: 68 IN | DIASTOLIC BLOOD PRESSURE: 88 MMHG | WEIGHT: 274 LBS | BODY MASS INDEX: 41.52 KG/M2 | TEMPERATURE: 96.8 F | SYSTOLIC BLOOD PRESSURE: 156 MMHG | HEART RATE: 74 BPM

## 2025-01-31 PROBLEM — 238136002: Status: ACTIVE | Noted: 2025-01-31

## 2025-01-31 RX ORDER — PANCRELIPASE LIPASE, PANCRELIPASE PROTEASE, PANCRELIPASE AMYLASE 40000; 126000; 168000 [USP'U]/1; [USP'U]/1; [USP'U]/1
AS DIRECTED CAPSULE, DELAYED RELEASE ORAL
Qty: 750 | Refills: 3 | OUTPATIENT

## 2025-01-31 RX ORDER — POTASSIUM CHLORIDE 10 MEQ/1
1 TABLET WITH FOOD TABLET, FILM COATED, EXTENDED RELEASE ORAL TWICE A DAY
Status: ACTIVE | COMMUNITY

## 2025-01-31 RX ORDER — PANCRELIPASE LIPASE, PANCRELIPASE PROTEASE, PANCRELIPASE AMYLASE 40000; 126000; 168000 [USP'U]/1; [USP'U]/1; [USP'U]/1
AS DIRECTED CAPSULE, DELAYED RELEASE ORAL
Qty: 750 | Refills: 3 | Status: ACTIVE | COMMUNITY

## 2025-01-31 RX ORDER — FAMOTIDINE 40 MG/1
1 TABLET AT BEDTIME TABLET, FILM COATED ORAL ONCE A DAY
Qty: 90 TABLET | Refills: 3 | Status: ACTIVE | COMMUNITY

## 2025-01-31 RX ORDER — HYDROCHLOROTHIAZIDE 25 MG/1
1 TABLET IN THE MORNING TABLET ORAL ONCE A DAY
Status: ACTIVE | COMMUNITY

## 2025-01-31 RX ORDER — FAMOTIDINE 40 MG/1
1 TABLET AT BEDTIME TABLET, FILM COATED ORAL ONCE A DAY
Qty: 90 TABLET | Refills: 3 | OUTPATIENT

## 2025-01-31 RX ORDER — PANTOPRAZOLE SODIUM 40 MG/1
1 TABLET TABLET, DELAYED RELEASE ORAL ONCE A DAY
Qty: 90 TABLET | Refills: 3 | OUTPATIENT

## 2025-01-31 RX ORDER — PANTOPRAZOLE SODIUM 40 MG/1
1 TABLET TABLET, DELAYED RELEASE ORAL ONCE A DAY
Qty: 90 TABLET | Refills: 3 | Status: ACTIVE | COMMUNITY

## 2025-01-31 NOTE — HPI-ZZZTODAY'S VISIT
The patient returns for follow-up for exocrine pancreatic insufficiency.  She continues on Zenpep and reports having cyclic episodes of diarrhea every 5 to 6 days lasting 24 to 48 hours and then resolving.  She reportedly was drinking a couple of glasses of 2 percent cows milk daily for years and switched to lactose-free milk 2 weeks ago and has had no further diarrhea.  Her last colonoscopy was completed in 2020 and aborted due to poor prep.  She was scheduled for a colonoscopy last year as her iron levels were lowand she did not complete testing.  Her hemoglobin and iron levels on her last routine blood work were noted to be normal per her own words.  History of  chronic GERD and continues on  pantoprazole and reports drinking Mylanta multiple times during the week due to breakthrough acid reflux and sometimes  will wake  her at night.  Her last upper endoscopy was completed in 2020, records available and reviewed  revealing a 5 cm hiatal hernia and grade B GERD.   History of persistently elevated alkaline phosphatase level and chronic liver disease work-up was unrevealing.  Right upper quadrant ultrasound completed 1 year ago was noted to be unremarkable.  Past medical history of rheumatoid arthritis and continues on Xeljanz. 07/17/2024 The patient is here for her yearly follow-up for history of EPI and fatty liver disease.  She continues on Zenpep and has had no further diarrhea over the past several months.  She occasionally will have constipation.  She continues on pantoprazole and famotidine for chronic GERD with intermittent breakthrough typically at night.  When I saw her last year I scheduled her for bidirectional endoscopyand she canceled the procedures due to a personal conflict. 01/31/2025 The pt is here for her 6-month appointment for EPI, GERD and FLD. She continues on Zenpep 2 capsules with each meal reporting a normal bowel pattern. She continues on Pantoprazole and Famotidine for chronic GERD and is under good control. She has FLD and continues on a low fat diet. She hasn't been able to exercise on a regular basis. She is currently maintaining her weight.

## 2025-02-11 ENCOUNTER — WEB ENCOUNTER (OUTPATIENT)
Dept: RURAL CLINIC 2 | Facility: CLINIC | Age: 60
End: 2025-02-11

## 2025-03-18 ENCOUNTER — OFFICE VISIT (OUTPATIENT)
Dept: RURAL CLINIC 2 | Facility: CLINIC | Age: 60
End: 2025-03-18
Payer: MEDICARE

## 2025-03-18 VITALS
BODY MASS INDEX: 41.46 KG/M2 | HEIGHT: 68 IN | TEMPERATURE: 97.1 F | DIASTOLIC BLOOD PRESSURE: 85 MMHG | WEIGHT: 273.6 LBS | HEART RATE: 89 BPM | SYSTOLIC BLOOD PRESSURE: 154 MMHG

## 2025-03-18 DIAGNOSIS — K86.81 EXOCRINE PANCREATIC INSUFFICIENCY: ICD-10-CM

## 2025-03-18 DIAGNOSIS — R11.15 CYCLICAL VOMITING WITH NAUSEA: ICD-10-CM

## 2025-03-18 DIAGNOSIS — K21.9 GERD WITHOUT ESOPHAGITIS: ICD-10-CM

## 2025-03-18 PROBLEM — 18773000: Status: ACTIVE | Noted: 2025-03-18

## 2025-03-18 PROCEDURE — 99213 OFFICE O/P EST LOW 20 MIN: CPT | Performed by: NURSE PRACTITIONER

## 2025-03-18 RX ORDER — PANTOPRAZOLE SODIUM 40 MG/1
1 TABLET TABLET, DELAYED RELEASE ORAL ONCE A DAY
Qty: 90 TABLET | Refills: 3 | Status: ACTIVE | COMMUNITY

## 2025-03-18 RX ORDER — PANCRELIPASE LIPASE, PANCRELIPASE PROTEASE, PANCRELIPASE AMYLASE 40000; 126000; 168000 [USP'U]/1; [USP'U]/1; [USP'U]/1
AS DIRECTED CAPSULE, DELAYED RELEASE ORAL
Qty: 750 | Refills: 3 | Status: ACTIVE | COMMUNITY

## 2025-03-18 RX ORDER — POTASSIUM CHLORIDE 10 MEQ/1
1 TABLET WITH FOOD TABLET, FILM COATED, EXTENDED RELEASE ORAL TWICE A DAY
Status: ACTIVE | COMMUNITY

## 2025-03-18 RX ORDER — FAMOTIDINE 40 MG/1
1 TABLET AT BEDTIME TABLET, FILM COATED ORAL ONCE A DAY
Qty: 90 TABLET | Refills: 3 | Status: ACTIVE | COMMUNITY

## 2025-03-18 RX ORDER — HYDROCHLOROTHIAZIDE 25 MG/1
1 TABLET IN THE MORNING TABLET ORAL ONCE A DAY
Status: ACTIVE | COMMUNITY

## 2025-03-18 NOTE — HPI-ZZZTODAY'S VISIT
The patient returns for follow-up for exocrine pancreatic insufficiency.  She continues on Zenpep and reports having cyclic episodes of diarrhea every 5 to 6 days lasting 24 to 48 hours and then resolving.  She reportedly was drinking a couple of glasses of 2 percent cows milk daily for years and switched to lactose-free milk 2 weeks ago and has had no further diarrhea.  Her last colonoscopy was completed in 2020 and aborted due to poor prep.  She was scheduled for a colonoscopy last year as her iron levels were lowand she did not complete testing.  Her hemoglobin and iron levels on her last routine blood work were noted to be normal per her own words.  History of  chronic GERD and continues on  pantoprazole and reports drinking Mylanta multiple times during the week due to breakthrough acid reflux and sometimes  will wake  her at night.  Her last upper endoscopy was completed in 2020, records available and reviewed  revealing a 5 cm hiatal hernia and grade B GERD.   History of persistently elevated alkaline phosphatase level and chronic liver disease work-up was unrevealing.  Right upper quadrant ultrasound completed 1 year ago was noted to be unremarkable.  Past medical history of rheumatoid arthritis and continues on Xeljanz. 07/17/2024 The patient is here for her yearly follow-up for history of EPI and fatty liver disease.  She continues on Zenpep and has had no further diarrhea over the past several months.  She occasionally will have constipation.  She continues on pantoprazole and famotidine for chronic GERD with intermittent breakthrough typically at night.  When I saw her last year I scheduled her for bidirectional endoscopyand she canceled the procedures due to a personal conflict. 01/31/2025 The pt is here for her 6-month appointment for EPI, GERD and FLD. She continues on Zenpep 2 capsules with each meal reporting a normal bowel pattern. She continues on Pantoprazole and Famotidine for chronic GERD and is under good control. She has FLD and continues on a low fat diet. She hasn't been able to exercise on a regular basis. She is currently maintaining her weight. 03/18/2025 She is f/u after an ER visit for  nausea with near syncope and diagnosed with vasovagal episodes. Apparently she has had a few over the past several months however not as significant. She f/u with her PCP and prescribed a low dose antidepressant to help relax the vagus nerve and  if with  further occurrences she was advised to lie down and raise the LE at the onset of nausea. She hasn't had nay further episodes in the past 5 weeks.  She continues on Zenpep with good control of EPI and Pantoprazole and Famotidine with good control of GERD.